# Patient Record
Sex: MALE | Race: WHITE | Employment: FULL TIME | URBAN - METROPOLITAN AREA
[De-identification: names, ages, dates, MRNs, and addresses within clinical notes are randomized per-mention and may not be internally consistent; named-entity substitution may affect disease eponyms.]

---

## 2017-10-21 ENCOUNTER — HOSPITAL ENCOUNTER (EMERGENCY)
Age: 72
Discharge: HOME | End: 2017-10-21
Attending: EMERGENCY MEDICINE
Payer: COMMERCIAL

## 2017-10-21 ENCOUNTER — APPOINTMENT (OUTPATIENT)
Dept: CT IMAGING | Age: 72
End: 2017-10-21
Attending: EMERGENCY MEDICINE
Payer: COMMERCIAL

## 2017-10-21 VITALS
HEIGHT: 65 IN | BODY MASS INDEX: 29.66 KG/M2 | TEMPERATURE: 98.2 F | DIASTOLIC BLOOD PRESSURE: 64 MMHG | RESPIRATION RATE: 16 BRPM | WEIGHT: 178 LBS | OXYGEN SATURATION: 96 % | SYSTOLIC BLOOD PRESSURE: 138 MMHG | HEART RATE: 70 BPM

## 2017-10-21 DIAGNOSIS — R42 VERTIGO: Primary | ICD-10-CM

## 2017-10-21 LAB
ALBUMIN SERPL-MCNC: 3.6 G/DL (ref 3.2–4.6)
ALBUMIN/GLOB SERPL: 1.2 {RATIO} (ref 1.2–3.5)
ALP SERPL-CCNC: 65 U/L (ref 50–136)
ALT SERPL-CCNC: 44 U/L (ref 12–65)
ANION GAP SERPL CALC-SCNC: 7 MMOL/L (ref 7–16)
AST SERPL-CCNC: 28 U/L (ref 15–37)
ATRIAL RATE: 66 BPM
BASOPHILS # BLD: 0 K/UL (ref 0–0.2)
BASOPHILS NFR BLD: 1 % (ref 0–2)
BILIRUB SERPL-MCNC: 0.2 MG/DL (ref 0.2–1.1)
BUN SERPL-MCNC: 20 MG/DL (ref 8–23)
CALCIUM SERPL-MCNC: 8.8 MG/DL (ref 8.3–10.4)
CALCULATED P AXIS, ECG09: 58 DEGREES
CALCULATED R AXIS, ECG10: -13 DEGREES
CALCULATED T AXIS, ECG11: 49 DEGREES
CHLORIDE SERPL-SCNC: 102 MMOL/L (ref 98–107)
CO2 SERPL-SCNC: 30 MMOL/L (ref 21–32)
CREAT SERPL-MCNC: 1.11 MG/DL (ref 0.8–1.5)
DIAGNOSIS, 93000: NORMAL
DIFFERENTIAL METHOD BLD: ABNORMAL
EOSINOPHIL # BLD: 0.5 K/UL (ref 0–0.8)
EOSINOPHIL NFR BLD: 7 % (ref 0.5–7.8)
ERYTHROCYTE [DISTWIDTH] IN BLOOD BY AUTOMATED COUNT: 15.9 % (ref 11.9–14.6)
GLOBULIN SER CALC-MCNC: 3 G/DL (ref 2.3–3.5)
GLUCOSE SERPL-MCNC: 124 MG/DL (ref 65–100)
HCT VFR BLD AUTO: 35.2 % (ref 41.1–50.3)
HGB BLD-MCNC: 11.1 G/DL (ref 13.6–17.2)
IMM GRANULOCYTES # BLD: 0 K/UL (ref 0–0.5)
IMM GRANULOCYTES NFR BLD: 1 % (ref 0–5)
LYMPHOCYTES # BLD: 1.3 K/UL (ref 0.5–4.6)
LYMPHOCYTES NFR BLD: 20 % (ref 13–44)
MCH RBC QN AUTO: 26 PG (ref 26.1–32.9)
MCHC RBC AUTO-ENTMCNC: 31.5 G/DL (ref 31.4–35)
MCV RBC AUTO: 82.4 FL (ref 79.6–97.8)
MONOCYTES # BLD: 0.6 K/UL (ref 0.1–1.3)
MONOCYTES NFR BLD: 9 % (ref 4–12)
NEUTS SEG # BLD: 4.2 K/UL (ref 1.7–8.2)
NEUTS SEG NFR BLD: 62 % (ref 43–78)
P-R INTERVAL, ECG05: 152 MS
PLATELET # BLD AUTO: 226 K/UL (ref 150–450)
PMV BLD AUTO: 9.7 FL (ref 10.8–14.1)
POTASSIUM SERPL-SCNC: 3.6 MMOL/L (ref 3.5–5.1)
PROT SERPL-MCNC: 6.6 G/DL (ref 6.3–8.2)
Q-T INTERVAL, ECG07: 394 MS
QRS DURATION, ECG06: 88 MS
QTC CALCULATION (BEZET), ECG08: 413 MS
RBC # BLD AUTO: 4.27 M/UL (ref 4.23–5.67)
SODIUM SERPL-SCNC: 139 MMOL/L (ref 136–145)
VENTRICULAR RATE, ECG03: 66 BPM
WBC # BLD AUTO: 6.6 K/UL (ref 4.3–11.1)

## 2017-10-21 PROCEDURE — 93005 ELECTROCARDIOGRAM TRACING: CPT | Performed by: EMERGENCY MEDICINE

## 2017-10-21 PROCEDURE — 96374 THER/PROPH/DIAG INJ IV PUSH: CPT | Performed by: EMERGENCY MEDICINE

## 2017-10-21 PROCEDURE — 74011250636 HC RX REV CODE- 250/636: Performed by: EMERGENCY MEDICINE

## 2017-10-21 PROCEDURE — 85025 COMPLETE CBC W/AUTO DIFF WBC: CPT | Performed by: EMERGENCY MEDICINE

## 2017-10-21 PROCEDURE — 96361 HYDRATE IV INFUSION ADD-ON: CPT | Performed by: EMERGENCY MEDICINE

## 2017-10-21 PROCEDURE — 70450 CT HEAD/BRAIN W/O DYE: CPT

## 2017-10-21 PROCEDURE — 80053 COMPREHEN METABOLIC PANEL: CPT | Performed by: EMERGENCY MEDICINE

## 2017-10-21 PROCEDURE — 74011250637 HC RX REV CODE- 250/637: Performed by: EMERGENCY MEDICINE

## 2017-10-21 PROCEDURE — 99284 EMERGENCY DEPT VISIT MOD MDM: CPT | Performed by: EMERGENCY MEDICINE

## 2017-10-21 RX ORDER — KETOROLAC TROMETHAMINE 30 MG/ML
15 INJECTION, SOLUTION INTRAMUSCULAR; INTRAVENOUS
Status: COMPLETED | OUTPATIENT
Start: 2017-10-21 | End: 2017-10-21

## 2017-10-21 RX ORDER — MECLIZINE HYDROCHLORIDE 25 MG/1
25 TABLET ORAL
Status: COMPLETED | OUTPATIENT
Start: 2017-10-21 | End: 2017-10-21

## 2017-10-21 RX ORDER — MECLIZINE HYDROCHLORIDE 25 MG/1
25 TABLET ORAL
Qty: 30 TAB | Refills: 0 | Status: SHIPPED | OUTPATIENT
Start: 2017-10-21 | End: 2017-10-31

## 2017-10-21 RX ORDER — PROMETHAZINE HYDROCHLORIDE 25 MG/1
25 TABLET ORAL
Qty: 12 TAB | Refills: 0 | Status: SHIPPED | OUTPATIENT
Start: 2017-10-21

## 2017-10-21 RX ORDER — PHENOL/SODIUM PHENOLATE
20 AEROSOL, SPRAY (ML) MUCOUS MEMBRANE DAILY
COMMUNITY

## 2017-10-21 RX ORDER — LORATADINE AND PSEUDOEPHEDRINE 10; 240 MG/1; MG/1
1 TABLET, EXTENDED RELEASE ORAL DAILY
COMMUNITY

## 2017-10-21 RX ORDER — PROMETHAZINE HYDROCHLORIDE 25 MG/1
25 TABLET ORAL
Status: COMPLETED | OUTPATIENT
Start: 2017-10-21 | End: 2017-10-21

## 2017-10-21 RX ADMIN — KETOROLAC TROMETHAMINE 15 MG: 30 INJECTION, SOLUTION INTRAMUSCULAR at 03:37

## 2017-10-21 RX ADMIN — SODIUM CHLORIDE 1000 ML: 900 INJECTION, SOLUTION INTRAVENOUS at 02:29

## 2017-10-21 RX ADMIN — PROMETHAZINE HYDROCHLORIDE 25 MG: 25 TABLET ORAL at 02:30

## 2017-10-21 RX ADMIN — MECLIZINE HYDROCHLORIDE 25 MG: 25 TABLET ORAL at 02:30

## 2017-10-21 NOTE — ED PROVIDER NOTES
HPI Comments: Patient with dizzy vertigo symptoms starting at 7:30 this morning he rolled out of bed. Heart triggered by movement. Improved with being still. Has some nausea with it. Since it has continued all day and came in tonight. Mild frontal HA. No blurry vision. No ear pain. Patient is a 71 y.o. male presenting with dizziness. The history is provided by the patient. No  was used. Dizziness   This is a new problem. The current episode started 12 to 24 hours ago. The problem has been gradually worsening. There was no focality noted. Primary symptoms include loss of balance. Pertinent negatives include no focal weakness, no loss of sensation, no slurred speech, no speech difficulty, no memory loss, no movement disorder, no agitation, no visual change, no auditory change, no mental status change, no unresponsiveness and no disorientation. There has been no fever. Associated symptoms include headaches (bilateral frontal) and nausea. Pertinent negatives include no shortness of breath, no chest pain, no vomiting, no altered mental status, no confusion, no bowel incontinence and no bladder incontinence. Past Medical History:   Diagnosis Date    Hypertension     Other Ill-Defined Conditions     hx of kidney stones       No past surgical history on file. No family history on file. Social History     Social History    Marital status: SINGLE     Spouse name: N/A    Number of children: N/A    Years of education: N/A     Occupational History    Not on file. Social History Main Topics    Smoking status: Never Smoker    Smokeless tobacco: Not on file    Alcohol use Yes      Comment: socially    Drug use: No    Sexual activity: Not on file     Other Topics Concern    Not on file     Social History Narrative    No narrative on file         ALLERGIES: Review of patient's allergies indicates no known allergies.     Review of Systems   Constitutional: Negative for chills and fever. HENT: Negative for ear pain, rhinorrhea and sore throat. Eyes: Negative for pain, redness and visual disturbance. Respiratory: Negative for chest tightness, shortness of breath and wheezing. Cardiovascular: Negative for chest pain and leg swelling. Gastrointestinal: Positive for nausea. Negative for abdominal pain, bowel incontinence, diarrhea and vomiting. Genitourinary: Negative for bladder incontinence, dysuria and hematuria. Musculoskeletal: Negative for back pain, gait problem, neck pain and neck stiffness. Skin: Negative for color change and rash. Neurological: Positive for dizziness, headaches (bilateral frontal) and loss of balance. Negative for focal weakness, speech difficulty, weakness, light-headedness and numbness. Psychiatric/Behavioral: Negative for agitation, confusion and memory loss. Vitals:    10/21/17 0159   BP: 135/61   Pulse: 77   Resp: 18   Temp: 98.2 °F (36.8 °C)   SpO2: 99%   Weight: 80.7 kg (178 lb)   Height: 5' 5\" (1.651 m)            Physical Exam   Constitutional: He is oriented to person, place, and time. He appears well-developed and well-nourished. HENT:   Head: Normocephalic and atraumatic. Right Ear: External ear normal.   Left Ear: External ear normal.   Nose: Nose normal.   Mouth/Throat: No oropharyngeal exudate. Eyes: Conjunctivae and EOM are normal. Pupils are equal, round, and reactive to light. Neck: Normal range of motion. Neck supple. Cardiovascular: Normal rate and regular rhythm. No murmur heard. Pulmonary/Chest: Effort normal and breath sounds normal. He has no wheezes. Abdominal: Soft. Bowel sounds are normal. There is no tenderness. Musculoskeletal: Normal range of motion. He exhibits no edema. Lymphadenopathy:     He has no cervical adenopathy. Neurological: He is alert and oriented to person, place, and time. No cranial nerve deficit. He exhibits normal muscle tone.  Coordination normal.   scott hallpike positive towards right. Skin: Skin is warm and dry. Nursing note and vitals reviewed. MDM  Number of Diagnoses or Management Options  Diagnosis management comments: Vertigo with mild relief with medication. Will discharge with continued meds at home and follow up. Amount and/or Complexity of Data Reviewed  Clinical lab tests: ordered and reviewed  Tests in the medicine section of CPT®: ordered and reviewed    Patient Progress  Patient progress: stable    ED Course       Procedures      EKG: normal sinus rhythm, nonspecific ST and T waves changes. RAte 66. CT HEAD WO CONT (Final result) Result time: 10/21/17 04:49:48     Final result by Lanney Mortimer, MD (10/21/17 04:49:48)     Impression:     IMPRESSION:    No acute intracranial abnormalities. Date of Dictation: 10/21/2017 4:49 AM        Narrative:     CT HEAD WITHOUT CONTRAST     HISTORY:  Headache. COMPARISON: None. TECHNIQUE: Axial imaging was performed without intravenous contrast utilizing  5mm slice thickness. Sagittal and coronal reformats were performed. FINDINGS:        *BRAIN:      -  There are no early signs of territorial or lacunar infarction by CT.     -  No intracranial mass, hematoma, or hydrocephalus.      -  For patient's age, the scattered areas of white matter hypodensities may  represent a chronic small vessel white matter ischemia.  However this is  nonspecific. *VISUALIZED PARANASAL SINUSES: Well aerated. *MASTOIDS:  Clear.   *CALVARIUM AND SCALP: Unremarkable.             Results Include:    Recent Results (from the past 24 hour(s))   CBC WITH AUTOMATED DIFF    Collection Time: 10/21/17  2:35 AM   Result Value Ref Range    WBC 6.6 4.3 - 11.1 K/uL    RBC 4.27 4.23 - 5.67 M/uL    HGB 11.1 (L) 13.6 - 17.2 g/dL    HCT 35.2 (L) 41.1 - 50.3 %    MCV 82.4 79.6 - 97.8 FL    MCH 26.0 (L) 26.1 - 32.9 PG    MCHC 31.5 31.4 - 35.0 g/dL    RDW 15.9 (H) 11.9 - 14.6 %    PLATELET 931 130 - 615 K/uL    MPV 9.7 (L) 10.8 - 14.1 FL    DF AUTOMATED      NEUTROPHILS 62 43 - 78 %    LYMPHOCYTES 20 13 - 44 %    MONOCYTES 9 4.0 - 12.0 %    EOSINOPHILS 7 0.5 - 7.8 %    BASOPHILS 1 0.0 - 2.0 %    IMMATURE GRANULOCYTES 1 0.0 - 5.0 %    ABS. NEUTROPHILS 4.2 1.7 - 8.2 K/UL    ABS. LYMPHOCYTES 1.3 0.5 - 4.6 K/UL    ABS. MONOCYTES 0.6 0.1 - 1.3 K/UL    ABS. EOSINOPHILS 0.5 0.0 - 0.8 K/UL    ABS. BASOPHILS 0.0 0.0 - 0.2 K/UL    ABS. IMM. GRANS. 0.0 0.0 - 0.5 K/UL   METABOLIC PANEL, COMPREHENSIVE    Collection Time: 10/21/17  2:35 AM   Result Value Ref Range    Sodium 139 136 - 145 mmol/L    Potassium 3.6 3.5 - 5.1 mmol/L    Chloride 102 98 - 107 mmol/L    CO2 30 21 - 32 mmol/L    Anion gap 7 7 - 16 mmol/L    Glucose 124 (H) 65 - 100 mg/dL    BUN 20 8 - 23 MG/DL    Creatinine 1.11 0.8 - 1.5 MG/DL    GFR est AA >60 >60 ml/min/1.73m2    GFR est non-AA >60 >60 ml/min/1.73m2    Calcium 8.8 8.3 - 10.4 MG/DL    Bilirubin, total 0.2 0.2 - 1.1 MG/DL    ALT (SGPT) 44 12 - 65 U/L    AST (SGOT) 28 15 - 37 U/L    Alk.  phosphatase 65 50 - 136 U/L    Protein, total 6.6 6.3 - 8.2 g/dL    Albumin 3.6 3.2 - 4.6 g/dL    Globulin 3.0 2.3 - 3.5 g/dL    A-G Ratio 1.2 1.2 - 3.5

## 2017-10-21 NOTE — ED NOTES
I have reviewed discharge instructions with the patient. The patient verbalized understanding. Patient left ED via Discharge Method: ambulatory to Home with (insert name of family, self,). Opportunity for questions and clarification provided. Patient given 2 scripts.

## 2017-10-21 NOTE — DISCHARGE INSTRUCTIONS
Vertigo: Care Instructions  Your Care Instructions  Vertigo is the feeling that you or your surroundings are moving when there is no actual movement. It is often described as a feeling of spinning, whirling, falling, or tilting. Vertigo may make you vomit or feel nauseated. You may have trouble standing or walking and may lose your balance. Vertigo is often related to an inner ear problem, but it can have other more serious causes. If vertigo continues, you may need more tests to find its cause. Follow-up care is a key part of your treatment and safety. Be sure to make and go to all appointments, and call your doctor if you are having problems. Its also a good idea to know your test results and keep a list of the medicines you take. How can you care for yourself at home? · Do not lie flat on your back. Prop yourself up slightly. This may reduce the spinning feeling. Keep your eyes open. · Move slowly so that you do not fall. · If your doctor recommends medicine, take it exactly as directed. · Do not drive while you are having vertigo. Certain exercises, called Mcintyre-Daroff exercises, can help decrease vertigo. To do Mcintyre-Daroff exercises:  · Sit on the edge of a bed or sofa and quickly lie down on the side that causes the worst vertigo. Lie on your side with your ear down. · Stay in this position for at least 30 seconds or until the vertigo goes away. · Sit up. If this causes vertigo, wait for it to stop. · Repeat the procedure on the other side. · Repeat this 10 times. Do these exercises 2 times a day until the vertigo is gone. When should you call for help? Call 911 anytime you think you may need emergency care. For example, call if:  · You passed out (lost consciousness). · You have symptoms of a stroke. These may include:  ¨ Sudden numbness, tingling, weakness, or loss of movement in your face, arm, or leg, especially on only one side of your body. ¨ Sudden vision changes.   ¨ Sudden trouble speaking. ¨ Sudden confusion or trouble understanding simple statements. ¨ Sudden problems with walking or balance. ¨ A sudden, severe headache that is different from past headaches. Call your doctor now or seek immediate medical care if:  · Vertigo occurs with a fever, a headache, or ringing in your ears. · You have new or increased nausea and vomiting. Watch closely for changes in your health, and be sure to contact your doctor if:  · Vertigo gets worse or happens more often. · Vertigo has not gotten better after 2 weeks. Where can you learn more? Go to http://bean-moises.info/. Enter I124 in the search box to learn more about \"Vertigo: Care Instructions. \"  Current as of: July 29, 2016  Content Version: 11.3  © 1225-7579 [employer]. Care instructions adapted under license by [employer] (which disclaims liability or warranty for this information). If you have questions about a medical condition or this instruction, always ask your healthcare professional. [place] [address] any warranty or liability for your use of this information. Epley Maneuver for Vertigo: Exercises  Your Care Instructions  The Epley Maneuver is a series of movements your doctor may use to treat your vertigo. Here are the steps for the exercises. Your doctor or physical therapist will guide you through the movements. A single 10- to 15-minute session often is all that's needed. Crystal debris (canaliths) cause the vertigo. When your head is moved into different positions, the debris moves freely. This may cause your symptoms to stop. How to do the exercises  Step 1    · You will sit on the doctor's exam table. Your legs will be out in front of you. The doctor or physical therapist will turn your head so that it is FDC between looking straight ahead and looking to the side that causes the worst vertigo.   · Without changing your head position, he or she will guide you back quickly. Your shoulders will be on the table. Your head will hang over the edge of the table. At this point, the side of your head that is causing the worst vertigo will face the floor. You'll stay in this position for 30 seconds or until your symptoms stop. Step 2    · Then, the doctor or physical therapist will turn your head to the other side. You don't need to lift your head. The other side of your head will face the floor. You will stay in this position for 30 seconds or until your symptoms stop. Step 3    · The doctor or physical therapist will help you roll your body in the same direction that your head is facing. You will lie on your side. (For example, if you are looking to your right, you will roll onto your right side.) The side that causes the worst symptoms should be facing up. You'll stay in this position for another 30 seconds or until your symptoms stop. Step 4    · The doctor or physical therapist will then help you to sit back up. Your legs will hang off the table on the same side that you were facing. Follow-up care is a key part of your treatment and safety. Be sure to make and go to all appointments, and call your doctor if you are having problems. It's also a good idea to know your test results and keep a list of the medicines you take. Where can you learn more? Go to http://bean-moises.info/. Enter L824 in the search box to learn more about \"Epley Maneuver for Vertigo: Exercises. \"  Current as of: July 29, 2016  Content Version: 11.3  © 9399-7664 "[employer]. Care instructions adapted under license by [employer] (which disclaims liability or warranty for this information). If you have questions about a medical condition or this instruction, always ask your healthcare professional. [address] any warranty or liability for your use of this information.

## 2019-04-04 ENCOUNTER — HOSPITAL ENCOUNTER (EMERGENCY)
Age: 74
Discharge: HOME | End: 2019-04-04
Attending: EMERGENCY MEDICINE
Payer: COMMERCIAL

## 2019-04-04 ENCOUNTER — APPOINTMENT (OUTPATIENT)
Dept: CT IMAGING | Age: 74
End: 2019-04-04
Attending: EMERGENCY MEDICINE
Payer: COMMERCIAL

## 2019-04-04 ENCOUNTER — APPOINTMENT (OUTPATIENT)
Dept: GENERAL RADIOLOGY | Age: 74
End: 2019-04-04
Attending: EMERGENCY MEDICINE
Payer: COMMERCIAL

## 2019-04-04 VITALS
WEIGHT: 176 LBS | BODY MASS INDEX: 29.32 KG/M2 | DIASTOLIC BLOOD PRESSURE: 70 MMHG | HEIGHT: 65 IN | TEMPERATURE: 98.8 F | HEART RATE: 72 BPM | RESPIRATION RATE: 16 BRPM | SYSTOLIC BLOOD PRESSURE: 154 MMHG | OXYGEN SATURATION: 93 %

## 2019-04-04 DIAGNOSIS — M79.18 MUSCULOSKELETAL PAIN: Primary | ICD-10-CM

## 2019-04-04 PROCEDURE — 71101 X-RAY EXAM UNILAT RIBS/CHEST: CPT

## 2019-04-04 PROCEDURE — 70450 CT HEAD/BRAIN W/O DYE: CPT

## 2019-04-04 PROCEDURE — 74011250637 HC RX REV CODE- 250/637: Performed by: EMERGENCY MEDICINE

## 2019-04-04 PROCEDURE — 99283 EMERGENCY DEPT VISIT LOW MDM: CPT | Performed by: EMERGENCY MEDICINE

## 2019-04-04 RX ORDER — HYDROCODONE BITARTRATE AND ACETAMINOPHEN 5; 325 MG/1; MG/1
1 TABLET ORAL ONCE
Status: COMPLETED | OUTPATIENT
Start: 2019-04-04 | End: 2019-04-04

## 2019-04-04 RX ORDER — DICLOFENAC SODIUM 50 MG/1
50 TABLET, DELAYED RELEASE ORAL 2 TIMES DAILY
Qty: 14 TAB | Refills: 0 | Status: SHIPPED | OUTPATIENT
Start: 2019-04-04

## 2019-04-04 RX ORDER — METAXALONE 800 MG/1
800 TABLET ORAL 3 TIMES DAILY
Qty: 9 TAB | Refills: 0 | Status: SHIPPED | OUTPATIENT
Start: 2019-04-04 | End: 2019-04-07

## 2019-04-04 RX ADMIN — HYDROCODONE BITARTRATE AND ACETAMINOPHEN 1 TABLET: 5; 325 TABLET ORAL at 21:20
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
## 2019-04-05 NOTE — ED NOTES
I have reviewed discharge instructions with the patient. The patient verbalized understanding. Patient left ED via Discharge Method: ambulatory to Home with friend/family. Opportunity for questions and clarification provided. Patient given 2 scripts. To continue your aftercare when you leave the hospital, you may receive an automated call from our care team to check in on how you are doing. This is a free service and part of our promise to provide the best care and service to meet your aftercare needs.  If you have questions, or wish to unsubscribe from this service please call [phone]. Thank you for Choosing our [facility].